# Patient Record
Sex: MALE | Race: OTHER | NOT HISPANIC OR LATINO | Employment: STUDENT | URBAN - METROPOLITAN AREA
[De-identification: names, ages, dates, MRNs, and addresses within clinical notes are randomized per-mention and may not be internally consistent; named-entity substitution may affect disease eponyms.]

---

## 2022-11-02 ENCOUNTER — HOSPITAL ENCOUNTER (EMERGENCY)
Facility: HOSPITAL | Age: 22
Discharge: HOME | End: 2022-11-02
Attending: EMERGENCY MEDICINE

## 2022-11-02 VITALS
RESPIRATION RATE: 18 BRPM | TEMPERATURE: 98.3 F | DIASTOLIC BLOOD PRESSURE: 78 MMHG | HEART RATE: 89 BPM | OXYGEN SATURATION: 99 % | SYSTOLIC BLOOD PRESSURE: 130 MMHG

## 2022-11-02 DIAGNOSIS — L02.91 ABSCESS: Primary | ICD-10-CM

## 2022-11-02 RX ORDER — LIDOCAINE HYDROCHLORIDE AND EPINEPHRINE 10; 10 MG/ML; UG/ML
10 INJECTION, SOLUTION INFILTRATION; PERINEURAL ONCE
Status: COMPLETED | OUTPATIENT
Start: 2022-11-02 | End: 2022-11-02

## 2022-11-02 RX ORDER — DOXYCYCLINE HYCLATE 100 MG/1
100 CAPSULE ORAL 2 TIMES DAILY
Qty: 14 CAPSULE | Refills: 0 | Status: SHIPPED | OUTPATIENT
Start: 2022-11-02 | End: 2022-11-09

## 2022-11-02 RX ORDER — CHLORHEXIDINE GLUCONATE 4 G/100ML
15 SOLUTION TOPICAL DAILY PRN
Qty: 120 ML | Refills: 0 | Status: SHIPPED | OUTPATIENT
Start: 2022-11-02

## 2022-11-02 RX ORDER — ACETAMINOPHEN 325 MG/1
975 TABLET ORAL ONCE
Status: COMPLETED | OUTPATIENT
Start: 2022-11-02 | End: 2022-11-02

## 2022-11-02 RX ORDER — KETOROLAC TROMETHAMINE 30 MG/ML
15 INJECTION, SOLUTION INTRAMUSCULAR; INTRAVENOUS ONCE
Status: COMPLETED | OUTPATIENT
Start: 2022-11-02 | End: 2022-11-02

## 2022-11-02 RX ADMIN — ACETAMINOPHEN 975 MG: 325 TABLET ORAL at 17:21

## 2022-11-02 RX ADMIN — LIDOCAINE HYDROCHLORIDE,EPINEPHRINE BITARTRATE 10 ML: 10; .01 INJECTION, SOLUTION INFILTRATION; PERINEURAL at 17:21

## 2022-11-02 RX ADMIN — KETOROLAC TROMETHAMINE 15 MG: 30 INJECTION, SOLUTION INTRAMUSCULAR; INTRAVENOUS at 17:20

## 2022-11-02 NOTE — ED ATTENDING ATTESTATION
11/2/2022  I, Margarett Boas, DO, saw and evaluated the patient  I have discussed the patient with the resident/non-physician practitioner and agree with the resident's/non-physician practitioner's findings, Plan of Care, and MDM as documented in the resident's/non-physician practitioner's note, except where noted  All available labs and Radiology studies were reviewed  I was present for key portions of any procedure(s) performed by the resident/non-physician practitioner and I was immediately available to provide assistance  At this point I agree with the current assessment done in the Emergency Department  I have conducted an independent evaluation of this patient a history and physical is as follows:    History provided by:  Patient  Abscess  Location:  Torso  Torso abscess location:  Abd LLQ  Abscess quality: induration, painful, redness and warmth    Red streaking: no    Progression:  Worsening  Pain details:     Quality:  No pain    Severity:  Mild    Timing:  Constant    Progression:  Worsening  Chronicity:  New  Relieved by:  Nothing  Worsened by:  Nothing  Ineffective treatments:  None tried  Associated symptoms: no fever, no headaches and no nausea     are as follows /78   Pulse 89   Temp 98 3 °F (36 8 °C)   Resp 18   SpO2 99%   Review of Systems Review of Systems   Constitutional: Negative for chills and fever  HENT: Negative for rhinorrhea, sore throat and trouble swallowing  Eyes: Negative for pain  Respiratory: Negative for cough, shortness of breath, wheezing and stridor  Cardiovascular: Negative for chest pain and leg swelling  Gastrointestinal: Negative for abdominal pain, diarrhea and nausea  Endocrine: Negative for polyuria  Genitourinary: Negative for dysuria, flank pain and urgency  Musculoskeletal: Negative for joint swelling, myalgias and neck stiffness  Skin: Negative for rash  Allergic/Immunologic: Negative for immunocompromised state     Neurological: Negative for dizziness, syncope, weakness, numbness and headaches  Psychiatric/Behavioral: Negative for confusion and suicidal ideas  All other systems reviewed and are negative  Physical Exam remarkable for Physical Exam  Vitals and nursing note reviewed  Constitutional:       Appearance: He is well-developed  HENT:      Head: Normocephalic and atraumatic  Eyes:      Pupils: Pupils are equal, round, and reactive to light  Cardiovascular:      Rate and Rhythm: Normal rate and regular rhythm  Heart sounds: Normal heart sounds  No murmur heard  No friction rub  Pulmonary:      Effort: Pulmonary effort is normal  No respiratory distress  Breath sounds: No wheezing or rales  Abdominal:      General: Bowel sounds are normal       Palpations: Abdomen is soft  There is no mass  Tenderness: There is no abdominal tenderness  There is no guarding  Musculoskeletal:      Cervical back: Normal range of motion and neck supple  Skin:     General: Skin is warm  Findings: No rash  Neurological:      Mental Status: He is alert and oriented to person, place, and time  Work up and treatment plan discussed with Treatment Team: Registered Nurse: Lyla Spann RN; Graduate Nurse: Clary Joshi RN; Resident: Kishor Dhillon DO; ED Technician: Gideon Ly and agreed upon plan  MDM  Number of Diagnoses or Management Options  Diagnosis management comments: 19-year-old male presents emergency department left lower quadrant abscess on the overlying skin with noted fluctuance and tenderness to palpation, little drain for incision and drainage in the emergency department  No fevers, vital stable no emergency department  Recommend outpatient follow-up with PCP  Lab Results: Lab Results: I have personally reviewed pertinent lab results  Imaging: I have personally reviewed pertinent reports      EKG, Pathology, and Other Studies: I have personally reviewed pertinent films in PACS    Clinical Impression:    Final diagnoses:   Abscess         Disposition    discharged           New Prescriptions:    Discharge Medication List as of 11/2/2022  6:26 PM      START taking these medications    Details   chlorhexidine (HIBICLENS) 4 % external liquid Apply 15 application topically daily as needed for wound care, Starting Wed 11/2/2022, Print      doxycycline hyclate (VIBRAMYCIN) 100 mg capsule Take 1 capsule (100 mg total) by mouth 2 (two) times a day for 7 days, Starting Wed 11/2/2022, Until Wed 11/9/2022, Print                  Follow-up Instructions:    [address] [place] [address] Emergency Department  [address] [id number]  [phone]  Go to   If symptoms worsen or if you have additional concerns    [employer]  [phone]    Call   To establish local primary care        ED Course         Critical Care Time  Procedures

## 2022-11-02 NOTE — ED PROVIDER NOTES
History  Chief Complaint   Patient presents with   • Abscess     Noticed an abscess on his lower L abdomen a few days  [employer] staff stated that it needed to be drained but they are unable to do it  Area is red and swollen  HPI  23-year-old male with no significant past medical history presents with complaint of abscess in the left lower quadrant of his abdomen  Patient noticed abscess 3 days ago and has grown in size since  He denies any fevers, chills, nausea, vomiting, diarrhea, constipation, abdominal pain  Patient has pain directly around the abscess area has drained on its own but is still fluctuant  Patient was seen by student health at [facility] was instructed to go to the emergency department for incision and drainage  None       History reviewed  No pertinent past medical history  History reviewed  No pertinent surgical history  History reviewed  No pertinent family history  I have reviewed and agree with the history as documented  E-Cigarette/Vaping   • E-Cigarette Use Never User      E-Cigarette/Vaping Substances     Social History     Tobacco Use   • Smoking status: Never Smoker   • Smokeless tobacco: Never Used   Vaping Use   • Vaping Use: Never used   Substance Use Topics   • Drug use: Not Currently        Review of Systems   Constitutional: Negative for chills and fever  HENT: Negative for congestion, ear pain, rhinorrhea and sore throat  Eyes: Negative for pain  Respiratory: Negative for apnea, cough, choking, chest tightness, shortness of breath, wheezing and stridor  Cardiovascular: Negative for chest pain, palpitations and leg swelling  Gastrointestinal: Negative for abdominal pain, constipation, diarrhea, nausea and vomiting  Genitourinary: Negative for hematuria  Musculoskeletal: Negative for arthralgias and back pain  Skin: Negative for rash and wound  Neurological: Negative for dizziness     Psychiatric/Behavioral: Negative for agitation and hallucinations  All other systems reviewed and are negative  Physical Exam  ED Triage Vitals   Temperature Pulse Respirations Blood Pressure SpO2   11/02/22 1509 11/02/22 1509 11/02/22 1509 11/02/22 1509 11/02/22 1509   98 3 °F (36 8 °C) 89 18 130/78 99 %      Temp src Heart Rate Source Patient Position - Orthostatic VS BP Location FiO2 (%)   -- -- -- -- --             Pain Score       11/02/22 1720       10 - Worst Possible Pain             Orthostatic Vital Signs  Vitals:    11/02/22 1509   BP: 130/78   Pulse: 89       Physical Exam  Vitals reviewed  Constitutional:       General: He is not in acute distress  Appearance: He is well-developed  HENT:      Head: Normocephalic and atraumatic  Right Ear: External ear normal       Left Ear: External ear normal       Nose: Nose normal  No congestion or rhinorrhea  Mouth/Throat:      Mouth: Mucous membranes are moist       Pharynx: No oropharyngeal exudate or posterior oropharyngeal erythema  Eyes:      General:         Right eye: No discharge  Left eye: No discharge  Extraocular Movements: Extraocular movements intact  Conjunctiva/sclera: Conjunctivae normal       Pupils: Pupils are equal, round, and reactive to light  Cardiovascular:      Rate and Rhythm: Normal rate and regular rhythm  Pulses: Normal pulses  Heart sounds: Normal heart sounds  Pulmonary:      Effort: Pulmonary effort is normal  No respiratory distress  Breath sounds: Normal breath sounds  No wheezing or rales  Abdominal:      Palpations: Abdomen is soft  Tenderness: There is no abdominal tenderness  Musculoskeletal:         General: No tenderness  Normal range of motion  Cervical back: Normal range of motion and neck supple  No rigidity  Skin:     General: Skin is warm  Findings: No rash        Comments: Fluctuant abscess in the left lower quadrant with surrounding erythema and induration   Neurological:      General: No focal deficit present  Mental Status: He is alert and oriented to person, place, and time  Cranial Nerves: No cranial nerve deficit  Sensory: No sensory deficit  Motor: No weakness  Coordination: Coordination normal    Psychiatric:         Mood and Affect: Mood normal          ED Medications  Medications   ketorolac (TORADOL) injection 15 mg (15 mg Intramuscular Given 11/2/22 1720)   acetaminophen (TYLENOL) tablet 975 mg (975 mg Oral Given 11/2/22 1721)   lidocaine-epinephrine (XYLOCAINE/EPINEPHRINE) 1 %-1:100,000 injection 10 mL (10 mL Infiltration Given 11/2/22 1721)       Diagnostic Studies  Results Reviewed     None                 No orders to display         Procedures  Incision and drain    Date/Time: 11/2/2022 6:34 PM  Performed by: Qing Justice DO  Authorized by: Qing Justice DO   Universal Protocol:  Consent: Verbal consent obtained  Patient location:  ED  Location:     Type:  Abscess    Size:  2cm    Location:  Trunk    Trunk location:  Abdomen  Procedure details:     Complexity:  Simple    Incision types:  Stab incision    Incision depth:  Subcutaneous    Wound management:  Probed and deloculated    Irrigation with saline:  Yes    Drainage:  Purulent    Drainage amount: Moderate    Wound treatment:  Wound left open    Packing materials:  None  Post-procedure details:     Patient tolerance of procedure: Tolerated well, no immediate complications          ED Course                                       MDM  Number of Diagnoses or Management Options  Risk of Complications, Morbidity, and/or Mortality  Presenting problems: low  Diagnostic procedures: low  Management options: low    Patient Progress  Patient progress: stable  56-year-old male with no significant past medical history presents with complaint of abscess in the left lower quadrant of his abdomen       Abscess  Patient undergoes incision and drainage without complication  Antibiotics prescribed  Patient is instructed to follow-up primary care doctor to establish primary care in the area given follow-up and return precautions  Disposition  Final diagnoses:   Abscess     Time reflects when diagnosis was documented in both MDM as applicable and the Disposition within this note     Time User Action Codes Description Comment    11/2/2022  6:17 PM Sj Stamp Add [L02 91] Abscess       ED Disposition     ED Disposition   Discharge    Condition   Stable    Date/Time   Wed Nov 2, 2022  6:17 PM    Comment   Keisha Bhatia discharge to home/self care  Follow-up Information     Follow up With Specialties Details Why Contact Info Additional [address] Emergency Department Emergency Medicine Go to  If symptoms worsen or if you have additional concerns [address] [id number]  [address] Emergency Department, [address], [address] [facility]    [address]  Call  To establish local primary care [phone]             Discharge Medication List as of 11/2/2022  6:26 PM      START taking these medications    Details   chlorhexidine (HIBICLENS) 4 % external liquid Apply 15 application topically daily as needed for wound care, Starting Wed 11/2/2022, Print      doxycycline hyclate (VIBRAMYCIN) 100 mg capsule Take 1 capsule (100 mg total) by mouth 2 (two) times a day for 7 days, Starting Wed 11/2/2022, Until Wed 11/9/2022, Print           No discharge procedures on file  PDMP Review     None           ED Provider  Attending physically available and evaluated Keisha Bhatia I managed the patient along with the ED Attending      Electronically Signed by         Verdel Boeck, DO  11/02/22 8781

## 2022-11-04 ENCOUNTER — HOSPITAL ENCOUNTER (EMERGENCY)
Facility: HOSPITAL | Age: 22
Discharge: HOME | End: 2022-11-04
Attending: EMERGENCY MEDICINE

## 2022-11-04 VITALS
SYSTOLIC BLOOD PRESSURE: 153 MMHG | DIASTOLIC BLOOD PRESSURE: 77 MMHG | RESPIRATION RATE: 16 BRPM | OXYGEN SATURATION: 99 % | TEMPERATURE: 98.6 F | HEART RATE: 84 BPM | WEIGHT: 196 LBS

## 2022-11-04 DIAGNOSIS — L02.91 ABSCESS: Primary | ICD-10-CM

## 2022-11-04 DIAGNOSIS — Z51.89 VISIT FOR WOUND CHECK: ICD-10-CM

## 2022-11-04 RX ORDER — SULFAMETHOXAZOLE AND TRIMETHOPRIM 800; 160 MG/1; MG/1
1 TABLET ORAL ONCE
Status: COMPLETED | OUTPATIENT
Start: 2022-11-04 | End: 2022-11-04

## 2022-11-04 RX ORDER — LIDOCAINE HYDROCHLORIDE 10 MG/ML
10 INJECTION, SOLUTION EPIDURAL; INFILTRATION; INTRACAUDAL; PERINEURAL ONCE
Status: COMPLETED | OUTPATIENT
Start: 2022-11-04 | End: 2022-11-04

## 2022-11-04 RX ORDER — SULFAMETHOXAZOLE AND TRIMETHOPRIM 800; 160 MG/1; MG/1
1 TABLET ORAL 2 TIMES DAILY
Qty: 10 TABLET | Refills: 0 | Status: SHIPPED | OUTPATIENT
Start: 2022-11-05 | End: 2022-11-10

## 2022-11-04 RX ADMIN — SULFAMETHOXAZOLE AND TRIMETHOPRIM 1 TABLET: 800; 160 TABLET ORAL at 13:12

## 2022-11-04 RX ADMIN — LIDOCAINE HYDROCHLORIDE 10 ML: 10 INJECTION, SOLUTION EPIDURAL; INFILTRATION; INTRACAUDAL; PERINEURAL at 12:05

## 2022-11-04 NOTE — DISCHARGE INSTRUCTIONS
If your wound packing remains in place on Monday, please remove the packing on Monday afternoon  You can continue to expect a small amount of bloody and purulent drainage from your wound  Once the packing is removed, the wound should close over the following week  Please keep the wound covered until it is closed  When taking a shower let hot water run over the wound to promote continued drainage  If the redness around her wound extends beyond the border of the outline placed on your abdomen you should return to the emergency department for re-evaluation  Please discontinue the doxycycline prescribed an begin taking Bactrim  You were given the 1st dose of this in the emergency department today  Your next dose will be tomorrow morning

## 2022-11-04 NOTE — ED PROCEDURE NOTE
Procedure  POC MSK/Soft Tissue US    Date/Time: 11/4/2022 12:44 PM  Performed by: Wilma Felipe DO  Authorized by: Wilma Felipe DO     Patient location:  ED  Performed by:  Resident  Other Assisting Provider: No    Procedure:     Performed: soft tissue ultrasound    Procedure details:     Exam Type:  Diagnostic    Longitudinal view:  Obtained    Transverse view:  Obtained    Image quality: diagnostic      Image availability:  Images available in PACS  Soft tissue ultrasound:     Soft tissue indications: swelling, pain, erythema and suspected abscess      Anatomic location:  Abdominal wall    Soft tissue findings: cobblestoning and subcutaneous collection (comment)      Soft tissue findings comment:  Patient had a large amount of cobblestoning, the wound was open and draining, small area of fluid collection  Interpretation:     Soft tissue impressions: consistent with cellulitis and consistent with abscess                       Cheng Alvarado DO  11/04/22 1240

## 2022-11-04 NOTE — ED PROVIDER NOTES
History  Chief Complaint   Patient presents with   • Wound Check     Pt states he had an infection on his stomach  Had an incision done 2 days ago and placed on abx  Was told the incision closed and the wound needs to be drained again      HPI     19-year-old male presenting for a wound check  The patient presented to the emergency department 2 days ago for an abscess to his left lower abdomen  He underwent incision and drainage was started on doxycycline  Today he was re-evaluated by [facility]  The evaluating physician was concerned that there was erythema and induration around the wound and recommended that the patient present to the emergency department for re-evaluation to determine whether additional drainage was needed  Patient denies fevers, chills, or infectious symptoms  No abdominal pain other than that that is localized to the area of the abscess  Prior to Admission Medications   Prescriptions Last Dose Informant Patient Reported? Taking?   chlorhexidine (HIBICLENS) 4 % external liquid   No No   Sig: Apply 15 application topically daily as needed for wound care   doxycycline hyclate (VIBRAMYCIN) 100 mg capsule   No No   Sig: Take 1 capsule (100 mg total) by mouth 2 (two) times a day for 7 days      Facility-Administered Medications: None       No past medical history on file  No past surgical history on file  No family history on file  I have reviewed and agree with the history as documented  E-Cigarette/Vaping   • E-Cigarette Use Never User      E-Cigarette/Vaping Substances     Social History     Tobacco Use   • Smoking status: Never Smoker   • Smokeless tobacco: Never Used   Vaping Use   • Vaping Use: Never used   Substance Use Topics   • Drug use: Not Currently       Review of Systems   Constitutional: Negative for chills and fever  HENT: Negative for congestion  Respiratory: Negative for shortness of breath      Cardiovascular: Negative for chest pain and leg swelling  Gastrointestinal: Negative for abdominal pain, diarrhea, nausea and vomiting  Musculoskeletal: Negative for arthralgias, back pain, neck pain and neck stiffness  Skin: Positive for wound (I&D site to the LLQ)  Negative for rash  Neurological: Negative for weakness, numbness and headaches  Psychiatric/Behavioral: Negative for agitation, behavioral problems and confusion  All other systems reviewed and are negative  Physical Exam  Physical Exam  Vitals and nursing note reviewed  Constitutional:       General: He is not in acute distress  Appearance: He is well-developed  He is not diaphoretic  HENT:      Head: Normocephalic and atraumatic  Right Ear: External ear normal       Left Ear: External ear normal       Nose: Nose normal    Eyes:      Conjunctiva/sclera: Conjunctivae normal    Cardiovascular:      Rate and Rhythm: Normal rate and regular rhythm  Heart sounds: Normal heart sounds  No murmur heard  No friction rub  No gallop  Pulmonary:      Effort: Pulmonary effort is normal  No respiratory distress  Breath sounds: Normal breath sounds  No wheezing or rales  Abdominal:      General: Bowel sounds are normal  There is no distension  Palpations: Abdomen is soft  Tenderness: There is no abdominal tenderness  There is no guarding  Comments: 3 cm abscess to the LLQ of the abdomen with central 1 cm single stab incision  Approximately 7 cm area of surrounding erythema/induration  Scant purulent drainage  Remainder of abdomen benign to deep palpation  Musculoskeletal:         General: No deformity  Normal range of motion  Cervical back: Normal range of motion and neck supple  Skin:     General: Skin is warm and dry  Neurological:      Mental Status: He is alert and oriented to person, place, and time  Motor: No abnormal muscle tone     Psychiatric:         Mood and Affect: Mood normal          Vital Signs  ED Triage Vitals   Temperature Pulse Respirations Blood Pressure SpO2   11/04/22 1130 11/04/22 1129 11/04/22 1129 11/04/22 1129 11/04/22 1129   98 6 °F (37 °C) 84 16 153/77 99 %      Temp src Heart Rate Source Patient Position - Orthostatic VS BP Location FiO2 (%)   -- -- -- -- --             Pain Score       --                  Vitals:    11/04/22 1129   BP: 153/77   Pulse: 84         Visual Acuity      ED Medications  Medications   lidocaine (PF) (XYLOCAINE-MPF) 1 % injection 10 mL (10 mL Infiltration Given 11/4/22 1205)   sulfamethoxazole-trimethoprim (BACTRIM DS) 800-160 mg per tablet 1 tablet (1 tablet Oral Given 11/4/22 1312)       Diagnostic Studies  Results Reviewed     Procedure Component Value Units Date/Time    Wound culture and Gram stain [[id number]]  (Abnormal) Collected: 11/04/22 1310    Lab Status: Preliminary result Specimen: Wound from Abdominal Updated: 11/04/22 1711     Gram Stain Result No polys seen      2+ Gram positive cocci in pairs                 No orders to display              Procedures  Incision and drain    Date/Time: 11/4/2022 7:56 PM  Performed by: Linh Osorio MD  Authorized by: Linh Osorio MD   Universal Protocol:  Procedure performed by:  Consent: Verbal consent obtained  Risks and benefits: risks, benefits and alternatives were discussed  Consent given by: patient      Patient location:  ED  Location:     Type:  Abscess    Size:  3 cm    Location:  Trunk    Trunk location:  Abdomen  Pre-procedure details:     Skin preparation:  Chloraprep  Anesthesia (see MAR for exact dosages): Anesthesia method:  Local infiltration    Local anesthetic:  Lidocaine 1% w/o epi  Procedure details:     Complexity:  Simple    Incision types:  Single straight    Scalpel blade:  11    Approach:  Open    Incision depth:  Subcutaneous    Wound management:  Probed and deloculated, irrigated with saline and debrided    Drainage:  Purulent and bloody    Drainage amount:   Moderate    Packing materials:  1/2 in gauze  Post-procedure details:     Patient tolerance of procedure: Tolerated well, no immediate complications             ED Course                                             MDM  Number of Diagnoses or Management Options  Abscess: established and worsening  Visit for wound check  Diagnosis management comments: Patient has a 3 cm abscess to the left lower lower quadrant with surrounding induration and erythema  Repeat incision and drainage was performed due to concern for incomplete prior drainage  Incision extended approximately 1 cm and abscess pocket probed with hemostats and deloculated  Abscess cavity flushed with saline and packing placed  Patient tolerated procedure well without complications  Purulent drainage sent for culture  Patient does give history of multiple abscesses in the past though not in this location  Will switch doxycycline to Bactrim for better empiric coverage of possible MRSA  Patient is not systemically ill  Patient counseled to remove packing in 3 days if it remains in place and to allow warm water to run over the abscess site while in the shower to promote continue drainage  Recommend recheck of wound in 3 days at [facility]  Return precautions discussed  Amount and/or Complexity of Data Reviewed  Clinical lab tests: ordered    Patient Progress  Patient progress: stable         Disposition  Final diagnoses:   Abscess   Visit for wound check     Time reflects when diagnosis was documented in both MDM as applicable and the Disposition within this note     Time User Action Codes Description Comment    11/4/2022  1:01 PM Pelon Coombs Add [L02 91] Abscess     11/4/2022  1:01 PM Pelon Reyes [Z51 89] Visit for wound check       ED Disposition     ED Disposition   Discharge    Condition   Stable    Date/Time   Fri Nov 4, 2022  1:01 PM    Comment   Katharina Orta discharge to home/self care                 Follow-up Information     Follow up With Specialties Details Why Contact Info Additional Information    Student health clinic   Please follow-up with [facility] for wound recheck on Monday  [address] Emergency Department Emergency Medicine  As we dicsussed, return to the [facility] Department for increased redness or swelling, fever, severe pain, or for new or concerning symptoms  [address] Emergency Department, [address], [place], [place], [address]          Discharge Medication List as of 11/4/2022  1:06 PM      START taking these medications    Details   sulfamethoxazole-trimethoprim (BACTRIM DS) 800-160 mg per tablet Take 1 tablet by mouth 2 (two) times a day for 5 days smx-tmp DS (BACTRIM) 800-160 mg tabs (1tab q12 D10) Do not start before November 5, 2022 , Starting Sat 11/5/2022, Until Thu 11/10/2022, Print         CONTINUE these medications which have NOT CHANGED    Details   chlorhexidine (HIBICLENS) 4 % external liquid Apply 15 application topically daily as needed for wound care, Starting Wed 11/2/2022, Print      doxycycline hyclate (VIBRAMYCIN) 100 mg capsule Take 1 capsule (100 mg total) by mouth 2 (two) times a day for 7 days, Starting Wed 11/2/2022, Until Wed 11/9/2022, Print             No discharge procedures on file      PDMP Review     None          ED Provider  Electronically Signed by           Tesha Mujica MD  11/04/22 2002

## 2022-11-06 LAB
BACTERIA WND AEROBE CULT: ABNORMAL
GRAM STN SPEC: ABNORMAL
GRAM STN SPEC: ABNORMAL